# Patient Record
(demographics unavailable — no encounter records)

---

## 2024-10-07 NOTE — HISTORY OF PRESENT ILLNESS
[FreeTextEntry6] : Here with Mom for concern for URI and swelling New cough on 10/5 Bottom lip began to swell on 10/5  Trialed icepack, but progressed  Has also had upper body hives that come and go Had right eye swelling that comes and goes Had temp 103 on Saturday, none since Benadryl PRN helps Today everything normal exam feels left cheek slightly swollen  Went to urgent care 10/5 and 10/6 ([facility]), covid negative, "other tests" which were negative  Was not told she had any specific infection, Mom thinks sinus infection was cause Mom was told all tests were negative Given augmentin, prednisone, and benadryl by , started all doses yesterday  No fast breathing, vomiting, diarrhea, abdominal pain, rashes or known allergies

## 2024-10-07 NOTE — DISCUSSION/SUMMARY
[FreeTextEntry1] : Here today for 3-4 days of mild URI symptoms and on and off face swelling and hives. No known allergic trigger and recurrence makes diagnosis less likely allergic reaction and more likely acute urticaria with viral trigger. No signs or symptoms of anaphylaxis when swelling occurs. Well appearing today without sign of bacterial superinfection. Given swelling remits less likely nephrology cause.  Urticaria:  - Recommend Cetirizine (10mg) or Loratadine (10mg) once daily in morning for 1 month  - May also use Benadryl (diphenhydramine) every 6 to 8 hours as needed for itching, try not to use more than 2-3 days / week - May also use over the counter hydrocortisone cream and cold compresses to help with itching - Rash may come and go over next 2-4 weeks, rarely lasts longer  - If rash lasts longer than 4 weeks please make allergist appointment, referral provided  - Discussed to watch for symptoms of anaphylaxis and call 911 for involvement of 2 systems including skin symptoms (raised white/red bumps, tongue or lip swelling), GI symptoms (emesis/diarrhea), or respiratory (hoarseness, wheezing, difficulty swallowing, itchy throat, fast breathing) within 2 hours of exposure  - Cold symptoms can last up to 10 to 14 days on average, sometimes longer - Keep your child well hydrated - Can use nasal saline drops/spray and humidifier for congestion and cough control - Use Acetaminophen or Ibuprofen for fever/pain - Do not use of over the counter decongestants or cough suppressants (especially if less than 6 years of age due to side effects) - Can use honey, 1 tablespoon twice daily, for cough (ONLY if older than 1 year of age, dangerous if your child is less than 1 year of age) - Call clinic for continued high fever past 48 to 72 hours for in office visit and further evaluation as this may be a sign of bacterial infection - Call office for any worsening or parental concern - Seek emergency medical attention if your child cannot tolerate anything by mouth and they pee less than 3 times in one day, patient becomes less alert or difficulty waking from sleep, have difficulty breathing (too fast or too hard), or has work of breathing such as retractions that are persistent. All of these symptoms require an emergent evaluation to rule out serious disease

## 2024-10-30 NOTE — HISTORY OF PRESENT ILLNESS
[Mother] : mother [Fruit] : fruit [Vegetables] : vegetables [Meat] : meat [Eggs] : eggs [Fish] : fish [Vitamins] : takes vitamins  [Eats healthy meals and snacks] : eats healthy meals and snacks [Eats meals with family] : eats meals with family [___ stools every other day] : [unfilled]  stools every other day [Loose] : stools are loose consistency [___ voids per day] : [unfilled] voids per day [Normal] : Normal [In own bed] : In own bed [Sleeps ___ hours per night] : sleeps [unfilled] hours per night [Brushing teeth twice/d] : brushing teeth twice per day [Flossing teeth] : flossing teeth [Toothpaste] : Primary Fluoride Source: Toothpaste [No] : No cigarette smoke exposure [Yes] : Patient goes to dentist yearly [Playtime (60 min/d)] : playtime 60 min a day [< 2 hrs of screen time per day] : less than 2 hrs of screen time per day [Appropiate parent-child-sibling interaction] : appropriate parent-child-sibling interaction [Does chores when asked] : does chores when asked [Has Friends] : has friends [Has chance to make own decisions] : has chance to make own decisions [Grade ___] : Grade [unfilled] [Adequate social interactions] : adequate social interactions [Adequate behavior] : adequate behavior [Adequate performance] : adequate performance [Adequate attention] : adequate attention [No difficulties with Homework] : no difficulties with homework [Appropriately restrained in motor vehicle] : appropriately restrained in motor vehicle [Supervised outdoor play] : supervised outdoor play [Supervised around water] : supervised around water [Wears helmet and pads] : wears helmet and pads [Parent knows child's friends] : parent knows child's friends [Parent discusses safety rules regarding adults] : parent discusses safety rules regarding adults [Family discusses home emergency plan] : family discusses home emergency plan [Monitored computer use] : monitored computer use [Exposure to tobacco] : no exposure to tobacco [Exposure to alcohol] : no exposure to alcohol [Exposure to electronic nicotine delivery system] : No exposure to electronic nicotine delivery system [Exposure to illicit drugs] : no exposure to illicit drugs [Up to date] : Up to date [FreeTextEntry7] : developed viral exanthem at the beginning of the month, however mother has an upcoming appointment with allergy as mother wanted to r/o food or environmental allergies [NO] : No [FreeTextEntry1] :  Cardiac Screening: Have you ever fainted, passed out or had an unexplained seizure suddenly and without warning, especially during exercise or in response to sudden loud noises such as doorbells, alarm clocks and ringing telephones? No Have you ever had exercise-related chest pain or shortness of breath? No Has anyone in your immediate family (parents, grandparents, siblings) or other more distant relatives (aunts, uncles, cousins)  of heart problems or had an unexpected sudden death before age 50 (This would include unexpected drownings, unexplained car accidents in which the relative was driving or sudden infant death syndrome.)? No Are you related to anyone with hypertrophic cardiomyopathy or hypertrophic obstructive cardiomyopathy, Marfan syndrome, arrhythmogenic right ventricular cardiomyopathy, long QT syndrome, short QT syndrome, Brugada syndrome or catecholaminergic polymorphic ventricular tachycardia, or anyone younger than 50 years with a pacemaker or implantable defibrillator? No   Result: No Increased risk of SCA or SCD

## 2025-01-21 NOTE — HISTORY OF PRESENT ILLNESS
[de-identified] : This is a 9-year-old female presenting with father for an initial consultation.  October 2024, father picked up patient from school due to bottom lip swelling.  Mother gave patient motrin at home as well as benadryl. She also had a fever of 103F at some point during the day. Later same night, lip swelling got worse. Patient was taken [facility], where she was antibiotics, prednisone and benadryl. No testing was done.  Next day, patient was seen by pediatrician who advised patient did not need antibiotics. All this point, patient's symptoms were resolved.  This was the first time patient had hives and swelling. No associated joint swelling/pain. No family history of hives or swelling. No family history of autoimmune conditions.  Ok with milk, egg, wheat, soy sauce, sesame, fish/shellfish, peanut/tree nuts.  No nasal or ocular symptoms related to seasonal change. No hx of wheezing or asthma. No eczema. No medication allergies.

## 2025-01-21 NOTE — END OF VISIT
[FreeTextEntry3] : MG Basurto has acted like a scribe on my behalf. I have reviewed the note and edited where appropriate. History, PE, assessment, and plan were personally performed by me.

## 2025-01-21 NOTE — HISTORY OF PRESENT ILLNESS
[de-identified] : This is a 9-year-old female presenting with father for an initial consultation.  October 2024, father picked up patient from school due to bottom lip swelling.  Mother gave patient motrin at home as well as benadryl. She also had a fever of 103F at some point during the day. Later same night, lip swelling got worse. Patient was taken [facility], where she was antibiotics, prednisone and benadryl. No testing was done.  Next day, patient was seen by pediatrician who advised patient did not need antibiotics. All this point, patient's symptoms were resolved.  This was the first time patient had hives and swelling. No associated joint swelling/pain. No family history of hives or swelling. No family history of autoimmune conditions.  Ok with milk, egg, wheat, soy sauce, sesame, fish/shellfish, peanut/tree nuts.  No nasal or ocular symptoms related to seasonal change. No hx of wheezing or asthma. No eczema. No medication allergies.

## 2025-01-21 NOTE — CONSULT LETTER
[Dear  ___] : Dear  [unfilled], [Consult Letter:] : I had the pleasure of evaluating your patient, [unfilled]. [Please see my note below.] : Please see my note below. [Consult Closing:] : Thank you very much for allowing me to participate in the care of this patient.  If you have any questions, please do not hesitate to contact me. [Sincerely,] : Sincerely, [FreeTextEntry2] : Dr. Shahab Campos